# Patient Record
Sex: FEMALE | Race: BLACK OR AFRICAN AMERICAN | Employment: FULL TIME | URBAN - METROPOLITAN AREA
[De-identification: names, ages, dates, MRNs, and addresses within clinical notes are randomized per-mention and may not be internally consistent; named-entity substitution may affect disease eponyms.]

---

## 2022-09-02 ENCOUNTER — HOSPITAL ENCOUNTER (OUTPATIENT)
Dept: PREADMISSION TESTING | Age: 42
Discharge: HOME | End: 2022-09-02
Payer: SELF-PAY

## 2022-09-02 VITALS
HEIGHT: 62 IN | DIASTOLIC BLOOD PRESSURE: 73 MMHG | RESPIRATION RATE: 20 BRPM | WEIGHT: 169.75 LBS | SYSTOLIC BLOOD PRESSURE: 113 MMHG | BODY MASS INDEX: 31.24 KG/M2 | HEART RATE: 89 BPM | TEMPERATURE: 98 F

## 2022-09-02 LAB
ANION GAP SERPL CALC-SCNC: 6 MMOL/L (ref 5–15)
ATRIAL RATE: 81 BPM
BASOPHILS # BLD: 0.1 K/UL (ref 0–0.1)
BASOPHILS NFR BLD: 1 % (ref 0–1)
BUN SERPL-MCNC: 15 MG/DL (ref 6–20)
BUN/CREAT SERPL: 22 (ref 12–20)
CALCIUM SERPL-MCNC: 9.3 MG/DL (ref 8.5–10.1)
CALCULATED P AXIS, ECG09: 49 DEGREES
CALCULATED R AXIS, ECG10: 6 DEGREES
CALCULATED T AXIS, ECG11: 40 DEGREES
CHLORIDE SERPL-SCNC: 108 MMOL/L (ref 97–108)
CO2 SERPL-SCNC: 26 MMOL/L (ref 21–32)
CREAT SERPL-MCNC: 0.69 MG/DL (ref 0.55–1.02)
DIAGNOSIS, 93000: NORMAL
DIFFERENTIAL METHOD BLD: ABNORMAL
EOSINOPHIL # BLD: 0.2 K/UL (ref 0–0.4)
EOSINOPHIL NFR BLD: 2 % (ref 0–7)
ERYTHROCYTE [DISTWIDTH] IN BLOOD BY AUTOMATED COUNT: 12.5 % (ref 11.5–14.5)
EST. AVERAGE GLUCOSE BLD GHB EST-MCNC: 128 MG/DL
GLUCOSE SERPL-MCNC: 115 MG/DL (ref 65–100)
HBA1C MFR BLD: 6.1 % (ref 4–5.6)
HCT VFR BLD AUTO: 33.5 % (ref 35–47)
HGB BLD-MCNC: 11.3 G/DL (ref 11.5–16)
IMM GRANULOCYTES # BLD AUTO: 0 K/UL (ref 0–0.04)
IMM GRANULOCYTES NFR BLD AUTO: 0 % (ref 0–0.5)
LYMPHOCYTES # BLD: 1.6 K/UL (ref 0.8–3.5)
LYMPHOCYTES NFR BLD: 19 % (ref 12–49)
MCH RBC QN AUTO: 29.1 PG (ref 26–34)
MCHC RBC AUTO-ENTMCNC: 33.7 G/DL (ref 30–36.5)
MCV RBC AUTO: 86.3 FL (ref 80–99)
MONOCYTES # BLD: 0.6 K/UL (ref 0–1)
MONOCYTES NFR BLD: 7 % (ref 5–13)
NEUTS SEG # BLD: 6 K/UL (ref 1.8–8)
NEUTS SEG NFR BLD: 71 % (ref 32–75)
NRBC # BLD: 0 K/UL (ref 0–0.01)
NRBC BLD-RTO: 0 PER 100 WBC
P-R INTERVAL, ECG05: 144 MS
PLATELET # BLD AUTO: 341 K/UL (ref 150–400)
PMV BLD AUTO: 9.6 FL (ref 8.9–12.9)
POTASSIUM SERPL-SCNC: 4 MMOL/L (ref 3.5–5.1)
Q-T INTERVAL, ECG07: 420 MS
QRS DURATION, ECG06: 72 MS
QTC CALCULATION (BEZET), ECG08: 487 MS
RBC # BLD AUTO: 3.88 M/UL (ref 3.8–5.2)
SODIUM SERPL-SCNC: 140 MMOL/L (ref 136–145)
VENTRICULAR RATE, ECG03: 81 BPM
WBC # BLD AUTO: 8.4 K/UL (ref 3.6–11)

## 2022-09-02 PROCEDURE — 85025 COMPLETE CBC W/AUTO DIFF WBC: CPT

## 2022-09-02 PROCEDURE — 93005 ELECTROCARDIOGRAM TRACING: CPT

## 2022-09-02 PROCEDURE — 80048 BASIC METABOLIC PNL TOTAL CA: CPT

## 2022-09-02 PROCEDURE — 83036 HEMOGLOBIN GLYCOSYLATED A1C: CPT

## 2022-09-02 RX ORDER — ERGOCALCIFEROL 1.25 MG/1
50000 CAPSULE ORAL
COMMUNITY

## 2022-09-02 RX ORDER — METFORMIN HYDROCHLORIDE 1000 MG/1
1000 TABLET ORAL 2 TIMES DAILY WITH MEALS
COMMUNITY
End: 2022-09-02

## 2022-09-02 RX ORDER — METFORMIN HYDROCHLORIDE 500 MG/1
500 TABLET ORAL DAILY
COMMUNITY

## 2022-09-02 NOTE — PERIOP NOTES
[address] INSTRUCTIONS    Surgery Date:   9-14-22    Your surgeon's office or [facility] staff will call you between 4 PM- 8 PM the day before surgery with your arrival time. If your surgery is on a Monday, you will receive a call the preceding Friday. Please report to [facility] Patient Access/Admitting on the 1st floor. Bring your insurance card, photo identification, and any copayment ( if applicable). If you are going home the same day of your surgery, you must have a responsible adult to drive you home. You need to have a responsible adult to stay with you the first 24 hours after surgery and you should not drive a car for 24 hours following your surgery. Nothing to eat or drink after midnight the night before surgery. This includes no water, gum, mints, coffee, juice, etc.  Please note special instructions, if applicable, below for medications. Do NOT drink alcohol or smoke 24 hours before surgery. STOP smoking for 14 days prior as it helps with breathing and healing after surgery. If you are being admitted to the hospital, please leave personal belongings/luggage in your car until you have an assigned hospital room number. Please wear comfortable clothes. Wear your glasses instead of contacts. We ask that all money, jewelry and valuables be left at home. Wear no make up, particularly mascara, the day of surgery. All body piercings, rings, and jewelry need to be removed and left at home. Please remove any nail polish or artificial nails from your fingernails. Please wear your hair loose or down. Please no pony-tails, buns, or any metal hair accessories. If you shower the morning of surgery, please do not apply any lotions or powders afterwards. You may wear deodorant, unless having breast surgery. Do not shave any body area within 24 hours of your surgery. Please follow all instructions to avoid any potential surgical cancellation.   Should your physical condition change, (i.e. fever, cold, flu, etc.) please notify your surgeon as soon as possible. It is important to be on time. If a situation occurs where you may be delayed, please call:  [phone] / [phone] on the day of surgery. The Preadmission Testing staff can be reached at [phone]. Special instructions: NONE      Current Outpatient Medications   Medication Sig    metFORMIN (GLUCOPHAGE) 500 mg tablet Take 500 mg by mouth daily. ergocalciferol (Vitamin D2) 1,250 mcg (50,000 unit) capsule Take 50,000 Units by mouth every seven (7) days. ON MONDAY    APPLE CIDER VINEGAR PO Take  by mouth daily. No current facility-administered medications for this encounter. YOU MUST ONLY TAKE THESE MEDICATIONS THE MORNING OF SURGERY WITH A SIP OF WATER: NONE  MEDICATIONS TO TAKE THE MORNING OF SURGERY ONLY IF NEEDED: NONE  HOLD these prescription medications BEFORE Surgery: HOLD METFORMIN 24 HOURS PRIOR TO SURGERY UNLESS OTHERWISE INSTRUCTED BY MD  Ask your surgeon/prescribing physician about when/if to STOP taking these medications: NONE  Stop all vitamins, herbal medicines and Aspirin containing products 7 days prior to surgery. Stop any non-steroidal anti-inflammatory drugs (i.e. Ibuprofen, Naproxen, Advil, Aleve) 3 days before surgery. You may take Tylenol. If you are currently taking Plavix, Coumadin, or any other blood-thinning/anticoagulant medication contact your prescribing physician for instructions. Preventing Infections Before and After - Your Surgery    IMPORTANT INSTRUCTIONS      You play an important role in your health and preparation for surgery. To reduce the germs on your skin you will need to shower with CHG soap (Chorhexidine gluconate 4%) two times before surgery. CHG soap (Hibiclens, Hex-A-Clens or store brand)  CHG soap will be provided at your Preadmission Testing (PAT) appointment.   If you do not have a PAT appointment before surgery, you may arrange to  CHG soap from our office or purchase CHG soap at a pharmacy, grocery or department store. You need to purchase TWO 4 ounce bottles to use for your 2 showers. Steps to follow:  Verneta Migdalia your hair with your normal shampoo and your body with regular soap and rinse well to remove shampoo and soap from your skin. Wet a clean washcloth and turn off the shower. Put CHG soap on washcloth and apply to your entire body from the neck down. Do not use on your head, face or private parts(genitals). Do not use CHG soap on open sores, wounds or areas of skin irritation. Wash you body gently for 5 minutes. Do not wash your skin too hard. This soap does not create lather. Pay special attention to your underarms and from your belly button to your feet. Turn the shower back on and rinse well to get CHG soap off your body. Pat your skin dry with a clean, dry towel. Do not apply lotions or moisturizer. Put on clean clothes and sleep on fresh bed sheets and do not allow pets to sleep with you. Shower with CHG soap 2 times before your surgery  The evening before your surgery  The morning of your surgery      Tips to help prevent infections after your surgery:  Protect your surgical wound from germs:  Hand washing is the most important thing you and your caregivers can do to prevent infections. Keep your bandage clean and dry! Do not touch your surgical wound. Use clean, freshly washed towels and washcloths every time you shower; do not share bath linens with others. Until your surgical wound is healed, wear clothing and sleep on bed linens each day that are clean and freshly washed. Do not allow pets to sleep in your bed with you or touch your surgical wound. Do not smoke - smoking delays wound healing. This may be a good time to stop smoking. If you have diabetes, it is important for you to manage your blood sugar levels properly before your surgery as well as after your surgery.  Poorly managed blood sugar levels slow down wound healing and prevent you from healing completely. Patient Information Regarding COVID Restrictions    Day of Procedure    Please park in the parking deck or any designated visitor parking lot. Enter the facility through the Main Entrance of the hospital.  On the day of surgery, please provide the cell phone number of the person who will be waiting for you to the Patient Access representative at the time of registration. Please wear a mask on the day of your procedure. We are now allowing two designated visitors per stay. Pediatric patients may have 2 designated visitors. These two people may come in with you on the day of your procedure. The designated visitor must also wear a mask. Once your procedure and the immediate recovery period is completed, a nurse in the recovery area will contact your designated visitor to inform them of your room number or to otherwise review other pertinent information regarding your care. Social distancing practices are to be adhered to in waiting areas and the cafeteria. The patient was contacted  in person. She verbalized understanding of all instructions does not  need reinforcement.

## 2022-09-13 ENCOUNTER — ANESTHESIA EVENT (OUTPATIENT)
Dept: MEDSURG UNIT | Age: 42
DRG: 581 | End: 2022-09-13
Payer: SELF-PAY

## 2022-09-14 ENCOUNTER — ANESTHESIA (OUTPATIENT)
Dept: MEDSURG UNIT | Age: 42
DRG: 581 | End: 2022-09-14
Payer: SELF-PAY

## 2022-09-14 ENCOUNTER — HOSPITAL ENCOUNTER (INPATIENT)
Age: 42
LOS: 1 days | Discharge: HOME | DRG: 581 | End: 2022-09-15
Attending: SURGERY | Admitting: SURGERY
Payer: SELF-PAY

## 2022-09-14 PROBLEM — Z41.1 ENCOUNTER FOR COSMETIC SURGERY: Status: ACTIVE | Noted: 2022-09-14

## 2022-09-14 LAB
GLUCOSE BLD STRIP.AUTO-MCNC: 101 MG/DL (ref 65–117)
GLUCOSE BLD STRIP.AUTO-MCNC: 179 MG/DL (ref 65–117)
HCG UR QL: NEGATIVE
SERVICE CMNT-IMP: ABNORMAL
SERVICE CMNT-IMP: NORMAL

## 2022-09-14 PROCEDURE — 74011250636 HC RX REV CODE- 250/636: Performed by: ANESTHESIOLOGY

## 2022-09-14 PROCEDURE — 74011000250 HC RX REV CODE- 250: Performed by: SURGERY

## 2022-09-14 PROCEDURE — 2709999900 HC NON-CHARGEABLE SUPPLY

## 2022-09-14 PROCEDURE — 2709999900 HC NON-CHARGEABLE SUPPLY: Performed by: SURGERY

## 2022-09-14 PROCEDURE — 74011250636 HC RX REV CODE- 250/636: Performed by: NURSE ANESTHETIST, CERTIFIED REGISTERED

## 2022-09-14 PROCEDURE — 74011000250 HC RX REV CODE- 250: Performed by: NURSE ANESTHETIST, CERTIFIED REGISTERED

## 2022-09-14 PROCEDURE — 76210000034 HC AMBSU PH I REC 0.5 TO 1 HR: Performed by: SURGERY

## 2022-09-14 PROCEDURE — 77030008462 HC STPLR SKN PROX J&J -A: Performed by: SURGERY

## 2022-09-14 PROCEDURE — 74011250636 HC RX REV CODE- 250/636: Performed by: SURGERY

## 2022-09-14 PROCEDURE — 77030026438 HC STYL ET INTUB CARD -A: Performed by: ANESTHESIOLOGY

## 2022-09-14 PROCEDURE — 65270000029 HC RM PRIVATE

## 2022-09-14 PROCEDURE — 74011000250 HC RX REV CODE- 250: Performed by: ANESTHESIOLOGY

## 2022-09-14 PROCEDURE — 76060000064 HC AMB SURG ANES 2 TO 2.5 HR: Performed by: SURGERY

## 2022-09-14 PROCEDURE — 74011250637 HC RX REV CODE- 250/637: Performed by: ANESTHESIOLOGY

## 2022-09-14 PROCEDURE — 77030008684 HC TU ET CUF COVD -B: Performed by: ANESTHESIOLOGY

## 2022-09-14 PROCEDURE — 77030040361 HC SLV COMPR DVT MDII -B: Performed by: SURGERY

## 2022-09-14 PROCEDURE — 81025 URINE PREGNANCY TEST: CPT

## 2022-09-14 PROCEDURE — 77030040922 HC BLNKT HYPOTHRM STRY -A

## 2022-09-14 PROCEDURE — 77030002916 HC SUT ETHLN J&J -A: Performed by: SURGERY

## 2022-09-14 PROCEDURE — 77030040206 HC TBNG LIPO SUC MDII -A: Performed by: SURGERY

## 2022-09-14 PROCEDURE — 82962 GLUCOSE BLOOD TEST: CPT

## 2022-09-14 PROCEDURE — 77030008534 HC TBNG LIPOSUC BYRO -B: Performed by: SURGERY

## 2022-09-14 PROCEDURE — 76030000004 HC AMB SURG OR TIME 2 TO 2.5: Performed by: SURGERY

## 2022-09-14 RX ORDER — ONDANSETRON 2 MG/ML
4 INJECTION INTRAMUSCULAR; INTRAVENOUS AS NEEDED
Status: DISCONTINUED | OUTPATIENT
Start: 2022-09-14 | End: 2022-09-14 | Stop reason: HOSPADM

## 2022-09-14 RX ORDER — SODIUM CHLORIDE 0.9 % (FLUSH) 0.9 %
5-40 SYRINGE (ML) INJECTION AS NEEDED
Status: DISCONTINUED | OUTPATIENT
Start: 2022-09-14 | End: 2022-09-14 | Stop reason: HOSPADM

## 2022-09-14 RX ORDER — FENTANYL CITRATE 50 UG/ML
INJECTION, SOLUTION INTRAMUSCULAR; INTRAVENOUS AS NEEDED
Status: DISCONTINUED | OUTPATIENT
Start: 2022-09-14 | End: 2022-09-14 | Stop reason: HOSPADM

## 2022-09-14 RX ORDER — CEFAZOLIN SODIUM 1 G/3ML
INJECTION, POWDER, FOR SOLUTION INTRAMUSCULAR; INTRAVENOUS AS NEEDED
Status: DISCONTINUED | OUTPATIENT
Start: 2022-09-14 | End: 2022-09-14

## 2022-09-14 RX ORDER — MIDAZOLAM HYDROCHLORIDE 1 MG/ML
INJECTION, SOLUTION INTRAMUSCULAR; INTRAVENOUS AS NEEDED
Status: DISCONTINUED | OUTPATIENT
Start: 2022-09-14 | End: 2022-09-14 | Stop reason: HOSPADM

## 2022-09-14 RX ORDER — ACETAMINOPHEN 500 MG
1000 TABLET ORAL
Status: DISCONTINUED | OUTPATIENT
Start: 2022-09-14 | End: 2022-09-15 | Stop reason: HOSPADM

## 2022-09-14 RX ORDER — HYDROMORPHONE HYDROCHLORIDE 4 MG/1
4 TABLET ORAL
Status: DISCONTINUED | OUTPATIENT
Start: 2022-09-14 | End: 2022-09-15 | Stop reason: HOSPADM

## 2022-09-14 RX ORDER — SCOLOPAMINE TRANSDERMAL SYSTEM 1 MG/1
1 PATCH, EXTENDED RELEASE TRANSDERMAL
Status: DISCONTINUED | OUTPATIENT
Start: 2022-09-14 | End: 2022-09-14 | Stop reason: HOSPADM

## 2022-09-14 RX ORDER — GLYCOPYRROLATE 0.2 MG/ML
INJECTION INTRAMUSCULAR; INTRAVENOUS AS NEEDED
Status: DISCONTINUED | OUTPATIENT
Start: 2022-09-14 | End: 2022-09-14 | Stop reason: HOSPADM

## 2022-09-14 RX ORDER — METFORMIN HYDROCHLORIDE 500 MG/1
500 TABLET ORAL DAILY
Status: DISCONTINUED | OUTPATIENT
Start: 2022-09-15 | End: 2022-09-15 | Stop reason: HOSPADM

## 2022-09-14 RX ORDER — LIDOCAINE HYDROCHLORIDE 10 MG/ML
0.1 INJECTION, SOLUTION EPIDURAL; INFILTRATION; INTRACAUDAL; PERINEURAL AS NEEDED
Status: DISCONTINUED | OUTPATIENT
Start: 2022-09-14 | End: 2022-09-14 | Stop reason: HOSPADM

## 2022-09-14 RX ORDER — DEXAMETHASONE SODIUM PHOSPHATE 4 MG/ML
INJECTION, SOLUTION INTRA-ARTICULAR; INTRALESIONAL; INTRAMUSCULAR; INTRAVENOUS; SOFT TISSUE AS NEEDED
Status: DISCONTINUED | OUTPATIENT
Start: 2022-09-14 | End: 2022-09-14 | Stop reason: HOSPADM

## 2022-09-14 RX ORDER — ACETAMINOPHEN 325 MG/1
650 TABLET ORAL ONCE
Status: COMPLETED | OUTPATIENT
Start: 2022-09-14 | End: 2022-09-14

## 2022-09-14 RX ORDER — ONDANSETRON 2 MG/ML
INJECTION INTRAMUSCULAR; INTRAVENOUS AS NEEDED
Status: DISCONTINUED | OUTPATIENT
Start: 2022-09-14 | End: 2022-09-14 | Stop reason: HOSPADM

## 2022-09-14 RX ORDER — SODIUM CHLORIDE, SODIUM LACTATE, POTASSIUM CHLORIDE, CALCIUM CHLORIDE 600; 310; 30; 20 MG/100ML; MG/100ML; MG/100ML; MG/100ML
50 INJECTION, SOLUTION INTRAVENOUS CONTINUOUS
Status: DISCONTINUED | OUTPATIENT
Start: 2022-09-14 | End: 2022-09-14 | Stop reason: HOSPADM

## 2022-09-14 RX ORDER — ROCURONIUM BROMIDE 10 MG/ML
INJECTION, SOLUTION INTRAVENOUS AS NEEDED
Status: DISCONTINUED | OUTPATIENT
Start: 2022-09-14 | End: 2022-09-14 | Stop reason: HOSPADM

## 2022-09-14 RX ORDER — PROPOFOL 10 MG/ML
INJECTION, EMULSION INTRAVENOUS AS NEEDED
Status: DISCONTINUED | OUTPATIENT
Start: 2022-09-14 | End: 2022-09-14 | Stop reason: HOSPADM

## 2022-09-14 RX ORDER — HYDROMORPHONE HYDROCHLORIDE 1 MG/ML
0.2 INJECTION, SOLUTION INTRAMUSCULAR; INTRAVENOUS; SUBCUTANEOUS
Status: DISCONTINUED | OUTPATIENT
Start: 2022-09-14 | End: 2022-09-14 | Stop reason: HOSPADM

## 2022-09-14 RX ORDER — SODIUM CHLORIDE 0.9 % (FLUSH) 0.9 %
5-40 SYRINGE (ML) INJECTION EVERY 8 HOURS
Status: DISCONTINUED | OUTPATIENT
Start: 2022-09-14 | End: 2022-09-15 | Stop reason: HOSPADM

## 2022-09-14 RX ORDER — SODIUM CHLORIDE, SODIUM LACTATE, POTASSIUM CHLORIDE, CALCIUM CHLORIDE 600; 310; 30; 20 MG/100ML; MG/100ML; MG/100ML; MG/100ML
INJECTION, SOLUTION INTRAVENOUS
Status: DISCONTINUED | OUTPATIENT
Start: 2022-09-14 | End: 2022-09-14

## 2022-09-14 RX ORDER — ONDANSETRON 2 MG/ML
4 INJECTION INTRAMUSCULAR; INTRAVENOUS
Status: DISCONTINUED | OUTPATIENT
Start: 2022-09-14 | End: 2022-09-15

## 2022-09-14 RX ORDER — LEVOFLOXACIN 5 MG/ML
500 INJECTION, SOLUTION INTRAVENOUS EVERY 24 HOURS
Status: COMPLETED | OUTPATIENT
Start: 2022-09-14 | End: 2022-09-14

## 2022-09-14 RX ORDER — SODIUM CHLORIDE 0.9 % (FLUSH) 0.9 %
5-40 SYRINGE (ML) INJECTION EVERY 8 HOURS
Status: DISCONTINUED | OUTPATIENT
Start: 2022-09-14 | End: 2022-09-14 | Stop reason: HOSPADM

## 2022-09-14 RX ORDER — FENTANYL CITRATE 50 UG/ML
50 INJECTION, SOLUTION INTRAMUSCULAR; INTRAVENOUS AS NEEDED
Status: DISCONTINUED | OUTPATIENT
Start: 2022-09-14 | End: 2022-09-14 | Stop reason: HOSPADM

## 2022-09-14 RX ORDER — LIDOCAINE HYDROCHLORIDE 20 MG/ML
INJECTION, SOLUTION EPIDURAL; INFILTRATION; INTRACAUDAL; PERINEURAL AS NEEDED
Status: DISCONTINUED | OUTPATIENT
Start: 2022-09-14 | End: 2022-09-14 | Stop reason: HOSPADM

## 2022-09-14 RX ORDER — NEOSTIGMINE METHYLSULFATE 1 MG/ML
INJECTION, SOLUTION INTRAVENOUS AS NEEDED
Status: DISCONTINUED | OUTPATIENT
Start: 2022-09-14 | End: 2022-09-14 | Stop reason: HOSPADM

## 2022-09-14 RX ORDER — FENTANYL CITRATE 50 UG/ML
25 INJECTION, SOLUTION INTRAMUSCULAR; INTRAVENOUS
Status: DISCONTINUED | OUTPATIENT
Start: 2022-09-14 | End: 2022-09-14 | Stop reason: HOSPADM

## 2022-09-14 RX ORDER — HYDROMORPHONE HYDROCHLORIDE 2 MG/ML
INJECTION, SOLUTION INTRAMUSCULAR; INTRAVENOUS; SUBCUTANEOUS AS NEEDED
Status: DISCONTINUED | OUTPATIENT
Start: 2022-09-14 | End: 2022-09-14 | Stop reason: HOSPADM

## 2022-09-14 RX ORDER — SODIUM CHLORIDE, SODIUM LACTATE, POTASSIUM CHLORIDE, CALCIUM CHLORIDE 600; 310; 30; 20 MG/100ML; MG/100ML; MG/100ML; MG/100ML
125 INJECTION, SOLUTION INTRAVENOUS CONTINUOUS
Status: DISCONTINUED | OUTPATIENT
Start: 2022-09-14 | End: 2022-09-15

## 2022-09-14 RX ORDER — DIPHENHYDRAMINE HCL 25 MG
25 CAPSULE ORAL
Status: DISCONTINUED | OUTPATIENT
Start: 2022-09-14 | End: 2022-09-15 | Stop reason: HOSPADM

## 2022-09-14 RX ORDER — NALOXONE HYDROCHLORIDE 0.4 MG/ML
0.4 INJECTION, SOLUTION INTRAMUSCULAR; INTRAVENOUS; SUBCUTANEOUS AS NEEDED
Status: DISCONTINUED | OUTPATIENT
Start: 2022-09-14 | End: 2022-09-15 | Stop reason: HOSPADM

## 2022-09-14 RX ORDER — SODIUM CHLORIDE 0.9 % (FLUSH) 0.9 %
5-40 SYRINGE (ML) INJECTION AS NEEDED
Status: DISCONTINUED | OUTPATIENT
Start: 2022-09-14 | End: 2022-09-15 | Stop reason: HOSPADM

## 2022-09-14 RX ORDER — PROCHLORPERAZINE EDISYLATE 5 MG/ML
10 INJECTION INTRAMUSCULAR; INTRAVENOUS
Status: DISCONTINUED | OUTPATIENT
Start: 2022-09-14 | End: 2022-09-15

## 2022-09-14 RX ORDER — HYDROMORPHONE HYDROCHLORIDE 1 MG/ML
1-2 INJECTION, SOLUTION INTRAMUSCULAR; INTRAVENOUS; SUBCUTANEOUS
Status: DISCONTINUED | OUTPATIENT
Start: 2022-09-14 | End: 2022-09-15

## 2022-09-14 RX ORDER — MIDAZOLAM HYDROCHLORIDE 1 MG/ML
1 INJECTION, SOLUTION INTRAMUSCULAR; INTRAVENOUS AS NEEDED
Status: DISCONTINUED | OUTPATIENT
Start: 2022-09-14 | End: 2022-09-14 | Stop reason: HOSPADM

## 2022-09-14 RX ADMIN — DEXAMETHASONE SODIUM PHOSPHATE 4 MG: 4 INJECTION, SOLUTION INTRAMUSCULAR; INTRAVENOUS at 14:22

## 2022-09-14 RX ADMIN — ROCURONIUM BROMIDE 10 MG: 10 SOLUTION INTRAVENOUS at 15:48

## 2022-09-14 RX ADMIN — WATER 2 G: 1 INJECTION INTRAMUSCULAR; INTRAVENOUS; SUBCUTANEOUS at 14:26

## 2022-09-14 RX ADMIN — ACETAMINOPHEN 650 MG: 325 TABLET, FILM COATED ORAL at 12:31

## 2022-09-14 RX ADMIN — PROPOFOL 50 MG: 10 INJECTION, EMULSION INTRAVENOUS at 16:25

## 2022-09-14 RX ADMIN — PROPOFOL 50 MG: 10 INJECTION, EMULSION INTRAVENOUS at 16:32

## 2022-09-14 RX ADMIN — LIDOCAINE HYDROCHLORIDE 100 MG: 20 INJECTION, SOLUTION EPIDURAL; INFILTRATION; INTRACAUDAL; PERINEURAL at 14:20

## 2022-09-14 RX ADMIN — LEVOFLOXACIN 500 MG: 500 INJECTION, SOLUTION INTRAVENOUS at 20:35

## 2022-09-14 RX ADMIN — PROPOFOL 100 MG: 10 INJECTION, EMULSION INTRAVENOUS at 14:25

## 2022-09-14 RX ADMIN — GLYCOPYRROLATE 0.4 MG: 0.2 INJECTION INTRAMUSCULAR; INTRAVENOUS at 16:34

## 2022-09-14 RX ADMIN — SODIUM CHLORIDE, POTASSIUM CHLORIDE, SODIUM LACTATE AND CALCIUM CHLORIDE 50 ML/HR: 600; 310; 30; 20 INJECTION, SOLUTION INTRAVENOUS at 12:54

## 2022-09-14 RX ADMIN — PROPOFOL 200 MG: 10 INJECTION, EMULSION INTRAVENOUS at 14:20

## 2022-09-14 RX ADMIN — ONDANSETRON 4 MG: 2 INJECTION INTRAMUSCULAR; INTRAVENOUS at 20:31

## 2022-09-14 RX ADMIN — ONDANSETRON HYDROCHLORIDE 4 MG: 2 INJECTION, SOLUTION INTRAMUSCULAR; INTRAVENOUS at 16:33

## 2022-09-14 RX ADMIN — MIDAZOLAM 2 MG: 1 INJECTION INTRAMUSCULAR; INTRAVENOUS at 14:12

## 2022-09-14 RX ADMIN — HYDROMORPHONE HYDROCHLORIDE 1 MG: 2 INJECTION, SOLUTION INTRAMUSCULAR; INTRAVENOUS; SUBCUTANEOUS at 14:53

## 2022-09-14 RX ADMIN — Medication 2 MG: at 16:34

## 2022-09-14 RX ADMIN — FENTANYL CITRATE 100 MCG: 50 INJECTION, SOLUTION INTRAMUSCULAR; INTRAVENOUS at 14:20

## 2022-09-14 RX ADMIN — SODIUM CHLORIDE, POTASSIUM CHLORIDE, SODIUM LACTATE AND CALCIUM CHLORIDE 125 ML/HR: 600; 310; 30; 20 INJECTION, SOLUTION INTRAVENOUS at 20:38

## 2022-09-14 RX ADMIN — ROCURONIUM BROMIDE 40 MG: 10 SOLUTION INTRAVENOUS at 14:20

## 2022-09-14 RX ADMIN — SODIUM CHLORIDE, POTASSIUM CHLORIDE, SODIUM LACTATE AND CALCIUM CHLORIDE: 600; 310; 30; 20 INJECTION, SOLUTION INTRAVENOUS at 16:05

## 2022-09-14 RX ADMIN — PROPOFOL 50 MG: 10 INJECTION, EMULSION INTRAVENOUS at 16:29

## 2022-09-14 RX ADMIN — HYDROMORPHONE HYDROCHLORIDE 1 MG: 1 INJECTION, SOLUTION INTRAMUSCULAR; INTRAVENOUS; SUBCUTANEOUS at 20:31

## 2022-09-14 NOTE — ANESTHESIA PREPROCEDURE EVALUATION
Relevant Problems   No relevant active problems       Anesthetic History   No history of anesthetic complications            Review of Systems / Medical History  Patient summary reviewed, nursing notes reviewed and pertinent labs reviewed    Pulmonary  Within defined limits                 Neuro/Psych   Within defined limits           Cardiovascular  Within defined limits                Exercise tolerance: >4 METS     GI/Hepatic/Renal  Within defined limits              Endo/Other    Diabetes         Other Findings              Physical Exam    Airway  Mallampati: II  TM Distance: 4 - 6 cm  Neck ROM: normal range of motion   Mouth opening: Normal     Cardiovascular    Rhythm: regular  Rate: normal         Dental  No notable dental hx       Pulmonary  Breath sounds clear to auscultation               Abdominal  Abdominal exam normal       Other Findings            Anesthetic Plan    ASA: 1  Anesthesia type: general          Induction: Intravenous  Anesthetic plan and risks discussed with: Patient

## 2022-09-14 NOTE — PERIOP NOTES
TRANSFER - OUT REPORT:    Verbal report given to alisson (name) on Israel Hays  being transferred to [facility](unit) for routine post - op       Report consisted of patients Situation, Background, Assessment and   Recommendations(SBAR). Time Pre op antibiotic given:2 grams ancef 1426  Anesthesia Stop time: 3185    Information from the following report(s) SBAR, OR Summary, Procedure Summary, Intake/Output, MAR, Recent Results, and Cardiac Rhythm nsr  was reviewed with the receiving nurse. Opportunity for questions and clarification was provided. Is the patient on 02? NO       L/Min        Other     Is the patient on a monitor? NO    Is the nurse transporting with the patient? NO    Surgical Waiting Area notified of patient's transfer from PACU?  YES      The following personal items collected during your admission accompanied patient upon transfer:   Dental Appliance: Dental Appliances: None  Vision: Visual Aid: None  Hearing Aid:    Jewelry:    Clothing: Clothing: Other (comment) (in locker)  Other Valuables:    Valuables sent to safe:      Clothing bag x 1 on stretcher (phone in bag)

## 2022-09-14 NOTE — BRIEF OP NOTE
Brief Postoperative Note    Patient: Jl Aguayo  YOB: 1980  MRN: [id number]    Date of Procedure: 9/14/2022     Pre-Op Diagnosis: COSMETIC    Post-Op Diagnosis: Same as preoperative diagnosis.       Procedure(s):  LIPOSUCTION WITH RENUVION J-PLASMA OF THE ABDOMEN    Surgeon(s):  Lu Freedman MD    Surgical Assistant: None    Anesthesia: General     Estimated Blood Loss (mL): less than 819     Complications: None    Specimens: * No specimens in log *     Implants: * No implants in log *    Drains: * No LDAs found *    Findings: excess fat and skin laxity    LIPOSUCTION ASPIRATE: 3100 CC  TUMESCENT INFILTRATE: 2900 CC  RENUVION ENERGY- 5.4 Kj, POWER 80, FLOW 3.0    Electronically Signed by Joseph Adorno MD on 9/14/2022 at 4:43 PM

## 2022-09-14 NOTE — ANESTHESIA POSTPROCEDURE EVALUATION
Procedure(s):  LIPOSUCTION WITH J PLASMA OF THE ABDOMEN. general    Anesthesia Post Evaluation      Multimodal analgesia: multimodal analgesia used between 6 hours prior to anesthesia start to PACU discharge  Patient location during evaluation: PACU  Level of consciousness: awake  Pain management: satisfactory to patient  Airway patency: patent  Anesthetic complications: no  Cardiovascular status: acceptable and hemodynamically stable  Respiratory status: acceptable  Hydration status: acceptable  Post anesthesia nausea and vomiting:  none  Final Post Anesthesia Temperature Assessment:  Normothermia (36.0-37.5 degrees C)      INITIAL Post-op Vital signs:   Vitals Value Taken Time   /76 09/14/22 1815   Temp 36.6 °C (97.9 °F) 09/14/22 1655   Pulse 92 09/14/22 1820   Resp 16 09/14/22 1820   SpO2 100 % 09/14/22 1800   Vitals shown include unvalidated device data. 23-Jan-2018 22:48

## 2022-09-14 NOTE — H&P
Pre-op History and Physical    CC: COSMETIC   HPI: 42y.o. year old female with COSMETIC deformity  presents  for Procedure(s):  LIPOSUCTION WITH J PLASMA OF THE ABDOMEN. Please see clinic HP for full details. Past medical history:   Past Medical History:   Diagnosis Date    Diabetes ([address]       Past surgical history:   Past Surgical History:   Procedure Laterality Date    HX DILATION AND CURETTAGE  2000    HX DILATION AND CURETTAGE  2002    HX HEENT      WISDOM TEETH REMOVED    HX LAP CHOLECYSTECTOMY  2019      Family history:   Family History   Problem Relation Age of Onset    Diabetes Mother     Heart Disease Mother     High Cholesterol Mother     Heart Disease Father     High Cholesterol Father     Diabetes Father     No Known Problems Brother     Anesth Problems Neg Hx       Social history:   Social History     Socioeconomic History    Marital status: SINGLE     Spouse name: Not on file    Number of children: Not on file    Years of education: Not on file    Highest education level: Not on file   Occupational History    Not on file   Tobacco Use    Smoking status: Never    Smokeless tobacco: Never   Vaping Use    Vaping Use: Never used   Substance and Sexual Activity    Alcohol use: Yes     Alcohol/week: 5.0 standard drinks     Types: 3 Glasses of wine, 2 Cans of beer per week    Drug use: Never    Sexual activity: Not on file   Other Topics Concern    Not on file   Social History Narrative    Not on file     Social Determinants of Health     Financial Resource Strain: Not on file   Food Insecurity: Not on file   Transportation Needs: Not on file   Physical Activity: Not on file   Stress: Not on file   Social Connections: Not on file   Intimate Partner Violence: Not on file   Housing Stability: Not on file      Home Medications:   Prior to Admission medications    Medication Sig Start Date End Date Taking? Authorizing Provider   metFORMIN (GLUCOPHAGE) 500 mg tablet Take 500 mg by mouth daily.    Yes Provider, Historical   ergocalciferol (ERGOCALCIFEROL) 1,250 mcg (50,000 unit) capsule Take 50,000 Units by mouth every seven (7) days. ON MONDAY   Yes Provider, Historical   APPLE CIDER VINEGAR PO Take  by mouth daily. Yes Provider, Historical      Allergies: Allergies   Allergen Reactions    Doxycycline Rash      Review of systems:  Denies headache, fever, chills, weight change, congestion, sore throat, chest pain, shortness of breath, nausea, vomiting, diarrhea, constipation, abdominal pain, generalized weakness, muscle or joint pain, and rash. Physical Exam:  Vitals: Blood pressure 119/85, pulse 85, temperature 98.1 °F (36.7 °C), resp. rate 18, weight 77 kg (169 lb 12.1 oz), last menstrual period 08/21/2022, SpO2 98 %.    General: awake and alert, NAD  Neck: supple  Cor: RRR  Lungs: clear  Abdomen: soft, non-tender, non-distended  Extremities: no edema  Breast:  Skin: excess fat and skin laxity abdomen  HEENT:      Impression: COSMETIC    Plan:  Procedure(s):  LIPOSUCTION WITH J PLASMA OF THE ABDOMEN

## 2022-09-14 NOTE — PROGRESS NOTES
TRANSFER - IN REPORT:    Verbal report received from [address] Khurram Hart RN(name) on [facility]  being received from Ambulatory Surgery PACU for routine post - op      Report consisted of patients Situation, Background, Assessment and   Recommendations(SBAR). Information from the following report(s) SBAR, Kardex, Procedure Summary, and Intake/Output was reviewed with the receiving nurse. Opportunity for questions and clarification was provided. Assessment completed upon patients arrival to unit and care assumed.

## 2022-09-15 VITALS
RESPIRATION RATE: 14 BRPM | TEMPERATURE: 98 F | WEIGHT: 169.75 LBS | SYSTOLIC BLOOD PRESSURE: 111 MMHG | DIASTOLIC BLOOD PRESSURE: 70 MMHG | HEART RATE: 94 BPM | OXYGEN SATURATION: 95 % | BODY MASS INDEX: 31.05 KG/M2

## 2022-09-15 LAB
GLUCOSE BLD STRIP.AUTO-MCNC: 150 MG/DL (ref 65–117)
GLUCOSE BLD STRIP.AUTO-MCNC: 158 MG/DL (ref 65–117)
SERVICE CMNT-IMP: ABNORMAL
SERVICE CMNT-IMP: ABNORMAL

## 2022-09-15 PROCEDURE — 82962 GLUCOSE BLOOD TEST: CPT

## 2022-09-15 PROCEDURE — 74011250637 HC RX REV CODE- 250/637: Performed by: SURGERY

## 2022-09-15 PROCEDURE — 0J083ZZ ALTERATION OF ABDOMEN SUBCUTANEOUS TISSUE AND FASCIA, PERCUTANEOUS APPROACH: ICD-10-PCS | Performed by: SURGERY

## 2022-09-15 PROCEDURE — 74011000250 HC RX REV CODE- 250: Performed by: SURGERY

## 2022-09-15 PROCEDURE — 74011250636 HC RX REV CODE- 250/636: Performed by: SURGERY

## 2022-09-15 RX ORDER — ONDANSETRON 4 MG/1
8 TABLET, ORALLY DISINTEGRATING ORAL
Status: DISCONTINUED | OUTPATIENT
Start: 2022-09-15 | End: 2022-09-15 | Stop reason: HOSPADM

## 2022-09-15 RX ORDER — PROCHLORPERAZINE EDISYLATE 5 MG/ML
5-10 INJECTION INTRAMUSCULAR; INTRAVENOUS
Status: DISCONTINUED | OUTPATIENT
Start: 2022-09-15 | End: 2022-09-15 | Stop reason: HOSPADM

## 2022-09-15 RX ADMIN — HYDROMORPHONE HYDROCHLORIDE 4 MG: 4 TABLET ORAL at 12:59

## 2022-09-15 RX ADMIN — SODIUM CHLORIDE, POTASSIUM CHLORIDE, SODIUM LACTATE AND CALCIUM CHLORIDE 125 ML/HR: 600; 310; 30; 20 INJECTION, SOLUTION INTRAVENOUS at 04:23

## 2022-09-15 RX ADMIN — SODIUM CHLORIDE, PRESERVATIVE FREE 10 ML: 5 INJECTION INTRAVENOUS at 04:24

## 2022-09-15 RX ADMIN — SODIUM CHLORIDE, PRESERVATIVE FREE 10 ML: 5 INJECTION INTRAVENOUS at 02:02

## 2022-09-15 RX ADMIN — ONDANSETRON 8 MG: 4 TABLET, ORALLY DISINTEGRATING ORAL at 12:58

## 2022-09-15 RX ADMIN — SODIUM CHLORIDE, POTASSIUM CHLORIDE, SODIUM LACTATE AND CALCIUM CHLORIDE 125 ML/HR: 600; 310; 30; 20 INJECTION, SOLUTION INTRAVENOUS at 09:31

## 2022-09-15 RX ADMIN — METFORMIN HYDROCHLORIDE 500 MG: 500 TABLET ORAL at 09:31

## 2022-09-15 RX ADMIN — ONDANSETRON 4 MG: 2 INJECTION INTRAMUSCULAR; INTRAVENOUS at 04:23

## 2022-09-15 RX ADMIN — HYDROMORPHONE HYDROCHLORIDE 1 MG: 1 INJECTION, SOLUTION INTRAMUSCULAR; INTRAVENOUS; SUBCUTANEOUS at 00:52

## 2022-09-15 RX ADMIN — PROCHLORPERAZINE EDISYLATE 10 MG: 5 INJECTION INTRAMUSCULAR; INTRAVENOUS at 02:02

## 2022-09-15 RX ADMIN — HYDROMORPHONE HYDROCHLORIDE 2 MG: 1 INJECTION, SOLUTION INTRAMUSCULAR; INTRAVENOUS; SUBCUTANEOUS at 04:23

## 2022-09-15 NOTE — PROGRESS NOTES
Problem: Pain  Goal: *Control of Pain  Outcome: Progressing Towards Goal  Goal: *PALLIATIVE CARE:  Alleviation of Pain  Outcome: Progressing Towards Goal     Problem: Patient Education: Go to Patient Education Activity  Goal: Patient/Family Education  Outcome: Progressing Towards Goal     Problem: Infection - Risk of, Surgical Site Infection  Goal: *Absence of surgical site infection signs and symptoms  Outcome: Progressing Towards Goal     Problem: Patient Education: Go to Patient Education Activity  Goal: Patient/Family Education  Outcome: Progressing Towards Goal     Problem: Surgical Wound Care  Goal: *Non-infected Wound: Absence of infection signs and symptoms  Description: Infection control procedures (eg: clean dressings, clean gloves, hand washing, precautions to isolate wound from contamination, sterile instruments used for wound debridement) should be implemented. Outcome: Progressing Towards Goal  Goal: *Infected Wound: Prevention of further infection and promotion of healing  Description: Consider the use of systemic antibiotics in patients with cellulitis, osteomyelitis, bacteremia, or sepsis if there are no contraindications.   Outcome: Progressing Towards Goal  Goal: *Improvement of existing wound and maintenance of skin integrity  Outcome: Progressing Towards Goal     Problem: Patient Education: Go to Patient Education Activity  Goal: Patient/Family Education  Outcome: Progressing Towards Goal

## 2022-09-15 NOTE — DISCHARGE SUMMARY
Admit date: 9/14/2022   Admitting Provider: Toño Fletcher MD    Discharge date: 9/15/2022  Discharging Provider: Toño Fletcher MD      * Admission Diagnoses: Encounter for cosmetic surgery [Z41.1]    * Discharge Diagnoses:    Hospital Problems as of 9/15/2022 Never Reviewed            Codes Class Noted - Resolved POA    Encounter for cosmetic surgery ICD-10-CM: Z41.1  ICD-9-CM: V50.1  9/14/2022 - Present Unknown           * Hospital Course: normal    * Procedures:   Procedure(s):  LIPOSUCTION WITH J PLASMA OF THE ABDOMEN      Consults: None    Significant Diagnostic Studies: none    Discharge Exam:    Visit Vitals  /70 (BP 1 Location: Right upper arm, BP Patient Position: At rest;Lying)   Pulse 94   Temp 98 °F (36.7 °C)   Resp 14   Wt 77 kg (169 lb 12.1 oz)   SpO2 95%   BMI 31.05 kg/m²   Physical Exam:    GENERAL: alert, cooperative, no distress, appears stated age, LUNG: clear to auscultation bilaterally, HEART: regular rate and rhythm, ABDOMEN: soft, non-tender. Bowel sounds normal. No masses,  no organomegaly, normal findings: no scars, striae, dilated veins, rashes, or lesions, EXTREMITIES:  extremities normal, atraumatic, no cyanosis or edema, no edema, redness or tenderness in the calves or thighs, SKIN: Normal. and no rash or abnormalities  Dressings changed. No hematoma . No hemorrhage. No thermal injury         * Discharge Condition: good  * Disposition: Home    Discharge Medications:  Current Discharge Medication List          * Follow-up Care/Patient Instructions:   Activity: Activity as directed, see instructions  Diet: Regular Diet  Wound Care: As directed    Follow up 1 week with Dr. Laila Higgins, next Thursday or Friday      Signed:  Toño Fletcher MD  9/15/2022  9:59 AM
stated

## 2022-09-15 NOTE — DISCHARGE INSTRUCTIONS
Instructions After Cosmetic Plastic Surgery Procedures- LIPOSUCTION  Eulalio Villalba MD AND Maegan Mcrae MD  [address]    Please follow these simple instructions to help ensure a safe and comfortable recovery. Any questions can be directed to the office at [phone]. Dr. Loi Heart can also be reached through the answering service at [phone]    You may shower starting tommorrow. Do not scrub the operative sites excessively. Remove dressings before shower    Apply a bandaid and ointment to your incisions when you get out of the shower. You have a prescription for an ointment called Mupirocin                                                                                                                                                                                                        Wear your postop garments at all times. Remove for shower. Apply a bandaid and Mupirocin ointment to your incisions when you get out of the shower. Then, put your garment on and reapply binder    Expect a modest amount of swelling  and  some bruising to appear in the days following your surgery. This is normal and will resolve as you heal.      The appearance of excessive wound redness, pus or fever or chills is not normal and should be reported to the office. Please also call us for excessive swelling, bleeding, or pain out of control. Mild to moderate pain is to be expected. You have been given a prescription for ___Dilaudid___. Swelling or discomfort will be improved by elevating the surgical site above the level of the heart, especially the face, scalp or arms, which can be elevated in bed by extra pillows. Ambulate around your house to prevent blood clots from forming in your legs. You should ambulate every 2-3 hours while awake. ABSOLUTELY NO USE OF HEATING PADS    NO HOT TUBS OR POOLS    DO NOT SOAK IN TUB, SHOWER ONLY      Please call the office at [phone] if you have any questions. Call for follow up appointment with Dr. Dorinda Christiansen on- Thursday or Friday of next week. Call [phone] to schedule       Additional Quick Reference Instructions for After Liposuction    What to Expect:  Drainage: Its not uncommon to have a significant amount of light red or yellow color drainage the night following surgery and into the next day. I recommend sleeping on a towel or genny protectors so as protect your bedding. Change gauze as needed, may also use maxi pads   Pain:    The local anesthetic medicine will most likely wear off about 1-2 hrs after surgery. You should take some oral pain medication (Dilaudid, Percocet, hydromorphine, oxycondeine) when you get home and before going to bed. Follow the instructions on the bottle. Swelling:  Expect to feel swollen, distended and bruised following surgery. You will see a change in the area immediately after surgery and the next day following removal of your garment to shower. However, dont be alarmed if the area swells further the following day and even may be assymetric. This is all part of the normal healing process. Most irregularities and swelling will resolve over the following 1-3 months. ICE/HEAT:  I do not recommend any ice therapy initially post surgery. You should also AVOID  heating pads as the skin is somewhat numb and can be burned. Bruising: Its not uncommon to have bruising even extending into the genital region. There should not be any very large tense bruises isolated to one area. If you are concerned called Dr. Joby Ceballos:  There are No food restrictions after local anesthesia. However, I would recommend eating small bland meals at first and drinking plenty of fluids.   Avoid Alcohol for the first 3 days post surgery and while you are taking the pain medications or valium    No Smoking or Nicotine products for 2 weeks post liposuction    Dressing Changes/Wound Care:  Remove binder and dressings the morning after surgery  You may use the adhesive lotion remover to remove the surgical prep  You may gently shower  Apply Antibiotic ointment to the incisions twice daily  Cover with 4x4 gauze & paper tape  or Bandaids    Garment/Compression:  You should keep the garment on the first night of surgery and wear the garment around 22 hours a day. It should be snug and comforting, not too tight, not too loose  You may wear a clean cotton t-shirt under the garment, this helps prevent irritation  If you notice the garment causing particular creases or pressure on one area of the skin, try to readjust the garment to allow for an even distribution of pressure    Showering: You may shower 24 hrs after the surgery  Do not submerge in water. Do not take a bath or swim until instructed by Dr. Jrei Toussaint. Typically once sutures are removed. Activity  Do not perform any strenuous activity or heavy lifting (greater than 10 lbs) for 2 weeks post surgery. Anything that raises your blood pressure can increase the risk for bleeding. Exercise:  you may begin walking anytime after your surgery, light jogging the following week. But you should refrain from serious exercise until cleared by Dr. Jeri Toussaint (typically 2-3 weeks)    Driving: You may not drive while on pain medication or valium. You should feel good enough that you are confident you can control the vehicle and slam on the brakes if needed. Medication  Antibiotics:  Oral Antibiotics: typically Keflex/ciprofloxin/cephalexin is prescribed. Take these the night of surgery and continue as instructed on the bottle until the prescription is completed.   Topical Antibiotic:  Either a prescription for bactroban/mupirocin will be written or over the counter Bacitracinshould be applied twice daily to the incisions. Dilaudid/Hydromorphine/oxycodone/Percocet:   One of these will be prescribe as your pain medication. Typically you can take 1-2 pills every 4-6hrs as needed. See instructions on bottle   Valium/Diazepam:  may also be prescribed, this medication helps with muscle pain, anxiety and sleep. Some patients find it helps a lot with pain as well. See instructions on the bottle, but usually you may take this every 6 hrs as need. Be careful taking this simultaneously with the pain medication. Initially space out taking the pain medication and valium by about one hour. Tylenol:  you may take Tylenol if you are prescribed dilaudid/hydromorphine. Do not exceed 3250 mg of Tylenol in a day and do not take it if prescribed Percocet/oxycodone/hydrocodone   Ibuprofen/aleve/naproxen/aspirin/advil:  Donot take, until 2 weeks after surgery  Nausea/Vomiting: This is not typical after awake surgery, but is common after general anesthesia. If you experience this Zofran or Phenergan can be prescribed  Stool Softener: It is recommended you take a stool softener as the pain medication can cause constipation. I recommend either Senna-S or Colace, two tablets twice daily     Follow Up:  Typically follow up is around 7-10 days post liposuction  You can call the office to arrange an appointment      Urgent Issues of Concern:After Hours Paging  [phone]      For Urgent issues contact Dr. Neisha Mckay and 911 if you believe its a true emergency. Call with any questions. During the day you can reach us at our office number or email during the day   Mitesh:         [phone]  [email or web address]. [email or web address]  Gwendolyn Barbosa:  [phone]  [email or web address]  Urgent Issues:    Fever above 101.5  Large amounts of bright red blood (more than one gauze pad)  Difficult Breathing, Chest Pain, Shortness of Breath  Increased Swelling in the Legs  Calf or Leg Pain   Redness or Pus at the Surgical Site        I acknowledge receipt of the above discharge instructions:    Patient/Guardian Signature _______________________________________ Date___________________    Myrtice Eth Signature_______________________________________________ Date___________________

## 2022-09-15 NOTE — PROGRESS NOTES
Patient and boyfriend at bedside verbalized discharge instructions. IV removed prior to patient leaving, tip intact and minimal blood loss. Volunteer assistance used with wheelchair roll out.

## 2022-09-15 NOTE — OP NOTES
[address]   OPERATIVE REPORT    Name:  Phillip Pena  MR#:  [id number]  :  1980  ACCOUNT #:  [de-identified]  DATE OF SERVICE:  2022    PREOPERATIVE DIAGNOSIS:  Cosmetic. POSTOPERATIVE DIAGNOSIS:  Cosmetic. PROCEDURE PERFORMED:  Liposuction of abdomen with Renuvion J-Plasma. SURGEON:  Evans Frey MD    ASSISTANT:  None. ANESTHESIA:  General.    COMPLICATIONS:  None. SPECIMENS REMOVED:  None. IMPLANTS:  None. ESTIMATED BLOOD LOSS:  Less than 100 mL. LIPOSUCTION ASPIRATE:  3100 mL. TUMESCENT INFILTRATE:  2900 mL. RENUVION J-PLASMA TOTAL ENERGY:  5.4 kilojoules. RENUVION J-PLASMA SETTINGS:  Power 80, flow 3.0. INDICATIONS:  The patient is a 48-year-old female who desired cosmetic improvement with regard to excess fat and skin laxity on her abdomen. She presents for liposuction of abdomen with Renuvion J-Plasma for subcutaneous tissue coagulation and tissue tightening. All risks have been discussed to include bleeding, infection, allergic reaction, anesthesia, wound healing problems, scarring, numbness, DVT, pulmonary embolism, myocardial infarction, stroke, death, contour irregularity, thermal injury, injury to deeper structures, fat embolism, gas embolism, possible need for reoperation, rehospitalization or revisionary surgery. She is understanding of all the above. She has signed informed consent. She was marked in preoperative holding area. SCDs were placed and activated prior to induction of anesthesia. The patient is aware of the risk of COVID-19 infection in the perioperative period and wishes to proceed with this elective procedure. PROCEDURE:  The patient was taken to the operating room and after adequate anesthesia had been provided. The patient's abdomen was prepped and draped in the usual sterile fashion. Small incisions were made at key locations and through these incisions, tumescent infiltration was performed.   After allowing time for this to take effect, liposuction was performed using vibration-assisted liposuction with the Vibrasat device. Then, 3 and 4-mm cannulas of varying styles were utilized until good reduction of fatty layer was achieved in the upper and lower abdomen as well as lateral abdominal areas. Care was taken to maintain liposuction in the deep subcutaneous plane and to avoid contour irregularity. Following use of vibrationally-assisted liposuction, conventional liposuction was then performed using 3 and 4-mm cannulas of varying styles. After completion of this, the Renuvion J-Plasma was brought onto the field. Settings were placed at [de-identified] and 3, three passes were then made in the upper and lower abdomen as well as the lateral abdominal areas. It should be noted that the lower abdominal treatment also involved the suprapubic area which was treated with liposuction as well as Renuvion J-Plasma. After completion of the Renuvion J-Plasma, final pass of liposuction was performed to remove any coagulum or helium gas. Incisions were then closed using interrupted 4-0 nylon suture. Sterile dressings and compressive binders were applied. All sponge, needle, and instrument counts were correct at the end of the case. The patient tolerated the procedure well and was transported in stable condition to the recovery room.       MD GREGG [email or web address]  D:  09/14/2022 16:58  T:  09/14/2022 23:38  JOB #:  [id number]

## 2022-09-15 NOTE — PROGRESS NOTES
Progress Note    Patient: Radhames Santo MRN: [id number]  SSN: [id number]    YOB: 1980  Age: 42 y.o. Sex: female      Admit Date: 2022    1 Day Post-Op    Procedure:  Procedure(s):  LIPOSUCTION WITH J PLASMA OF THE ABDOMEN    Subjective:     Patient has complaints of nausea. Mild nausea, controlled by Zofran    Objective:     Visit Vitals  /70 (BP 1 Location: Right upper arm, BP Patient Position: At rest;Lying)   Pulse 94   Temp 98 °F (36.7 °C)   Resp 14   Wt 77 kg (169 lb 12.1 oz)   SpO2 95%   BMI 31.05 kg/m²       Temp (24hrs), Av.1 °F (36.7 °C), Min:97.9 °F (36.6 °C), Max:98.6 °F (37 °C)      Physical Exam:    GENERAL: alert, cooperative, no distress, appears stated age, LUNG: clear to auscultation bilaterally, HEART: regular rate and rhythm, ABDOMEN: soft, non-tender. Bowel sounds normal. No masses,  no organomegaly, normal findings: no scars, striae, dilated veins, rashes, or lesions, EXTREMITIES:  extremities normal, atraumatic, no cyanosis or edema, no edema, redness or tenderness in the calves or thighs, SKIN: Normal. and no rash or abnormalities  Dressings changed. No hematoma . No hemorrhage. No thermal injury    Data Review: reviewed  Nursing documentation and I & O    Lab Review: All lab results for the last 24 hours reviewed.     Assessment:     Hospital Problems  Never Reviewed            Codes Class Noted POA    Encounter for cosmetic surgery ICD-10-CM: Z41.1  ICD-9-CM: V50.1  2022 Unknown           Plan/Recommendations/Medical Decision Making:     Continue present treatment Discharge today

## 2024-11-20 ENCOUNTER — HOSPITAL ENCOUNTER (EMERGENCY)
Facility: HOSPITAL | Age: 44
Discharge: HOME | End: 2024-11-20
Attending: EMERGENCY MEDICINE
Payer: COMMERCIAL

## 2024-11-20 ENCOUNTER — APPOINTMENT (OUTPATIENT)
Facility: HOSPITAL | Age: 44
End: 2024-11-20
Payer: COMMERCIAL

## 2024-11-20 VITALS
DIASTOLIC BLOOD PRESSURE: 83 MMHG | RESPIRATION RATE: 18 BRPM | OXYGEN SATURATION: 99 % | HEIGHT: 62 IN | BODY MASS INDEX: 28.34 KG/M2 | HEART RATE: 97 BPM | WEIGHT: 154 LBS | SYSTOLIC BLOOD PRESSURE: 129 MMHG | TEMPERATURE: 97.7 F

## 2024-11-20 DIAGNOSIS — J40 BRONCHITIS: Primary | ICD-10-CM

## 2024-11-20 LAB
FLUAV RNA SPEC QL NAA+PROBE: NOT DETECTED
FLUBV RNA SPEC QL NAA+PROBE: NOT DETECTED
SARS-COV-2 RNA RESP QL NAA+PROBE: NOT DETECTED
SOURCE: NORMAL

## 2024-11-20 PROCEDURE — 99284 EMERGENCY DEPT VISIT MOD MDM: CPT

## 2024-11-20 PROCEDURE — 71046 X-RAY EXAM CHEST 2 VIEWS: CPT

## 2024-11-20 PROCEDURE — 93005 ELECTROCARDIOGRAM TRACING: CPT | Performed by: EMERGENCY MEDICINE

## 2024-11-20 PROCEDURE — 87636 SARSCOV2 & INF A&B AMP PRB: CPT

## 2024-11-20 RX ORDER — PREDNISONE 20 MG/1
20 TABLET ORAL 2 TIMES DAILY
Qty: 10 TABLET | Refills: 0 | Status: SHIPPED | OUTPATIENT
Start: 2024-11-20 | End: 2024-11-25

## 2024-11-20 RX ORDER — ALBUTEROL SULFATE 90 UG/1
2 INHALANT RESPIRATORY (INHALATION) 4 TIMES DAILY PRN
Qty: 18 G | Refills: 0 | Status: SHIPPED | OUTPATIENT
Start: 2024-11-20

## 2024-11-20 RX ORDER — AZITHROMYCIN 250 MG/1
TABLET, FILM COATED ORAL
Qty: 6 TABLET | Refills: 0 | Status: SHIPPED | OUTPATIENT
Start: 2024-11-20 | End: 2024-11-30

## 2024-11-20 ASSESSMENT — ENCOUNTER SYMPTOMS
ABDOMINAL PAIN: 0
BACK PAIN: 0
NAUSEA: 0
COUGH: 1
EYES NEGATIVE: 1
SHORTNESS OF BREATH: 0
ALLERGIC/IMMUNOLOGIC NEGATIVE: 1
RHINORRHEA: 0
DIARRHEA: 0

## 2024-11-20 ASSESSMENT — PAIN SCALES - GENERAL: PAINLEVEL_OUTOF10: 5

## 2024-11-20 ASSESSMENT — PAIN DESCRIPTION - DESCRIPTORS: DESCRIPTORS: ACHING

## 2024-11-20 ASSESSMENT — PAIN DESCRIPTION - LOCATION: LOCATION: GENERALIZED

## 2024-11-20 ASSESSMENT — PAIN - FUNCTIONAL ASSESSMENT: PAIN_FUNCTIONAL_ASSESSMENT: 0-10

## 2024-11-21 LAB
EKG ATRIAL RATE: 94 BPM
EKG DIAGNOSIS: NORMAL
EKG P AXIS: 68 DEGREES
EKG P-R INTERVAL: 132 MS
EKG Q-T INTERVAL: 386 MS
EKG QRS DURATION: 76 MS
EKG QTC CALCULATION (BAZETT): 482 MS
EKG R AXIS: 49 DEGREES
EKG T AXIS: 59 DEGREES
EKG VENTRICULAR RATE: 94 BPM

## 2024-11-21 NOTE — ED PROVIDER NOTES
[facility] EMERGENCY DEPT  EMERGENCY DEPARTMENT ENCOUNTER      Pt Name: Chanel Phillips  MRN: [id number]  Birthdate 1980  Date of evaluation: 11/20/2024  Provider: TOMY Carrasco    CHIEF COMPLAINT       Chief Complaint   Patient presents with    Cough         HISTORY OF PRESENT ILLNESS   (Location/Symptom, Timing/Onset, Context/Setting, Quality, Duration, Modifying Factors, Severity)  Note limiting factors.   Chanel Phillips is a 44 y.o. female  who presents by private vehicle to ER with c/o Patient presents with:  Cough  Patient presents with cough, congestion, body aches. Symptoms started Monday.  Reports chest pain with cough, denies any shortness of breath.  Denies any sick contacts.  Denies fever.    She specifically denies any fevers, chills, nausea, vomiting, chest pain, shortness of breath, headache, rash, diarrhea, abdominal pain, urinary/bowel changes, sweating or weight loss.    PCP: No primary care provider on file.   PMHx significant for: Past Medical History:  No date: Diabetes (HCC)   PSHx significant for: Past Surgical History:  2019: CHOLECYSTECTOMY, LAPAROSCOPIC  2000: DILATION AND CURETTAGE OF UTERUS  2002: DILATION AND CURETTAGE OF UTERUS  No date: HEENT      Comment:  WISDOM TEETH REMOVED  Social Hx: Tobacco use: Social History    Tobacco Use      Smoking status: Never      Smokeless tobacco: Never  ; EtOH use: The patient states she drinks 0 per week.; Illicit Drug use:     Allergies:   -- Doxycycline -- Rash    There are no other complaints, changes or physical findings at this time.       The history is provided by the patient.         Review of External Medical Records:     Nursing Notes were reviewed.    REVIEW OF SYSTEMS    (2-9 systems for level 4, 10 or more for level 5)     Review of Systems   Constitutional:  Positive for fatigue. Negative for activity change, appetite change and fever.   HENT: Negative.  Negative for congestion, mouth sores and rhinorrhea.    Eyes: Negative.

## 2024-11-21 NOTE — ED TRIAGE NOTES
Patient ambulatory to triage reporting cough, chest pain, ear pain, body aches, fever since Monday. Pt denying being around anyone sick.     Aleve taken 1pm today

## 2025-01-23 ENCOUNTER — HOSPITAL ENCOUNTER (OUTPATIENT)
Facility: HOSPITAL | Age: 45
Discharge: HOME | End: 2025-01-26
Payer: COMMERCIAL

## 2025-01-23 VITALS — WEIGHT: 154 LBS | BODY MASS INDEX: 28.17 KG/M2

## 2025-01-23 DIAGNOSIS — Z12.39 ENCOUNTER FOR OTHER SCREENING FOR MALIGNANT NEOPLASM OF BREAST: ICD-10-CM

## 2025-01-23 PROCEDURE — 77063 BREAST TOMOSYNTHESIS BI: CPT

## 2025-02-14 ENCOUNTER — HOSPITAL ENCOUNTER (OUTPATIENT)
Facility: HOSPITAL | Age: 45
Discharge: HOME | End: 2025-02-17
Payer: COMMERCIAL

## 2025-02-14 DIAGNOSIS — R92.8 ABNORMAL MAMMOGRAM: ICD-10-CM

## 2025-02-14 PROCEDURE — 76642 ULTRASOUND BREAST LIMITED: CPT

## (undated) DEVICE — TOWEL SURG W17XL27IN STD BLU COT NONFENESTRATED PREWASHED

## (undated) DEVICE — SPONGE LAP 18X18IN STRL -- 5/PK

## (undated) DEVICE — 3M™ TEGADERM™ TRANSPARENT FILM DRESSING FRAME STYLE, 1626W, 4 IN X 4-3/4 IN (10 CM X 12 CM), 50/CT 4CT/CASE: Brand: 3M™ TEGADERM™

## (undated) DEVICE — TUBING SUCT L9FT FOR AUTOFUSE INFLTR SYS

## (undated) DEVICE — SPONGE GZ W4XL4IN COT 12 PLY TYP VII WVN C FLD DSGN

## (undated) DEVICE — GLOVE ORANGE PI 7   MSG9070

## (undated) DEVICE — MINOR BASIN -SMH: Brand: MEDLINE INDUSTRIES, INC.

## (undated) DEVICE — SOL INJ L R 1000ML BG --

## (undated) DEVICE — SUTURE ETHLN SZ 4-0 L18IN NONABSORBABLE BLK L19MM PS-2 3/8 1667H

## (undated) DEVICE — SYR 10ML LUER LOK 1/5ML GRAD --

## (undated) DEVICE — GARMENT,MEDLINE,DVT,INT,CALF,MED, GEN2: Brand: MEDLINE

## (undated) DEVICE — SUTURE ETHLN SZ 4-0 L18IN NONABSORBABLE BLK L16MM PC-3 3/8 1864G

## (undated) DEVICE — INTENDED FOR TISSUE SEPARATION, AND OTHER PROCEDURES THAT REQUIRE A SHARP SURGICAL BLADE TO PUNCTURE OR CUT.: Brand: BARD-PARKER ® CARBON RIB-BACK BLADES

## (undated) DEVICE — PACK,ORTOHMAX/CVMAX,UNIVERSAL,5/CS: Brand: MEDLINE

## (undated) DEVICE — DRAPE,REIN 53X77,STERILE: Brand: MEDLINE

## (undated) DEVICE — HANDLE LT SNAP ON ULT DURABLE LENS FOR TRUMPF ALC DISPOSABLE

## (undated) DEVICE — SYR 50ML LR LCK 1ML GRAD NSAF --

## (undated) DEVICE — PAD,ABDOMINAL,5"X9",ST,LF,25/BX: Brand: MEDLINE INDUSTRIES, INC.

## (undated) DEVICE — SOLUTION IRRIG 1000ML 0.9% SOD CHL USP POUR PLAS BTL

## (undated) DEVICE — Device

## (undated) DEVICE — UNDERPAD INCON STD 36X23IN --

## (undated) DEVICE — PREP SKN CHLRAPRP APL 26ML STR --

## (undated) DEVICE — NEEDLE HYPO 22GA L1.5IN BLK POLYPR HUB S STL REG BVL STR

## (undated) DEVICE — PREMIUM WET SKIN PREP TRAY: Brand: MEDLINE INDUSTRIES, INC.

## (undated) DEVICE — TUBING LIPO L10FT OD3IN HVY DUTY